# Patient Record
Sex: FEMALE | Race: WHITE | Employment: UNEMPLOYED | URBAN - METROPOLITAN AREA
[De-identification: names, ages, dates, MRNs, and addresses within clinical notes are randomized per-mention and may not be internally consistent; named-entity substitution may affect disease eponyms.]

---
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
## 2023-01-10 NOTE — PATIENT INSTRUCTIONS
"  Patient Education   After Your COVID-19 (Coronavirus) Test  You have been tested for COVID-19 (coronavirus).   If you'll have surgery in the next few days, we'll let you know ahead of time if you have the virus. Please call your surgeon's office with any questions.  For all other patients: Results are usually available in [employer] within 2 to 3 days.   If you do not have a [employer] account, you'll get a letter in the mail in about 7 to 10 days.   [employer] is often the fastest way to get test results. Please sign up if you do not already have a [employer] account. See the handout Getting COVID-19 Test Results in [employer] for help.  What if my test result is positive?  If your test is positive and you have not viewed your result in [employer], you'll get a phone call with your result. (A positive test means that you have the virus.)     Follow the tips under \"How do I self-isolate?\" below for 10 days (20 days if you have a weak immune system).    You don't need to be retested for COVID-19 before going back to school or work. As long as you're fever-free and feeling better, you can go back to school, work and other activities after waiting the 10 or 20 days.  What if I have questions after I get my results?  If you have questions about your results, please visit our testing website at [email or web address].   After 7 to 10 days, if you have not gotten your results:     Call [phone] ([phone]) and ask to speak with our COVID-19 results team.    If you're being treated at an infusion center: Call your infusion center directly.  What are the symptoms of COVID-19?  Cough, fever and trouble breathing are the most common signs of COVID-19.  Other symptoms can include new headaches, new muscle or body aches, new and unexplained fatigue (feeling very tired), chills, sore throat, congestion (stuffy or runny nose), diarrhea (loose poop), loss of taste or smell, belly pain, and nausea or vomiting " "(feeling sick to your stomach or throwing up).  You may already have symptoms of COVID-19, or they may show up later.  What should I do if I have symptoms?  If you're having surgery: Call your surgeon's office.  For all other patients: Stay home and away from others (self-isolate) until ...    You've had no fever--and no medicine that reduces fever--for 1 full day (24 hours), AND    Other symptoms have gotten better. For example, your cough or breathing has improved, AND    At least 10 days have passed since your symptoms first started.  How do I self-isolate?    Stay in your own room, even for meals. Use your own bathroom if you can.    Stay away from others in your home. No hugging, kissing or shaking hands. No visitors.    Don't go to work, school or anywhere else.    Clean \"high touch\" surfaces often (doorknobs, counters, handles). Use household cleaning spray or wipes. You'll find a full list of  on the EPA website: www.epa.gov/pesticide-registration/list-n-disinfectants-use-against-sars-cov-2.    Cover your mouth and nose with a mask or other face covering to avoid spreading germs.    Wash your hands and face often. Use soap and water.    Caregivers in these groups are at risk for severe illness due to COVID-19:  ? People 65 years and older  ? People who live in a nursing home or long-term care facility  ? People with chronic disease (lung, heart, cancer, diabetes, kidney, liver, immunologic)  ? People who have a weakened immune system, including those who:    Are in cancer treatment    Take medicine that weakens the immune system, such as corticosteroids    Had a bone marrow or organ transplant    Have an immune deficiency    Have poorly controlled HIV or AIDS    Are obese (body mass index of 40 or higher)    Smoke regularly    Caregivers should wear gloves while washing dishes, handling laundry and cleaning bedrooms and bathrooms.    Use caution when washing and drying laundry: Don't shake dirty " laundry and use the warmest water setting that you can.    For more tips on managing your health at home, go to www.cdc.gov/coronavirus/2019-ncov/downloads/10Things.pdf.  How can I take care of myself at home?  1. Get lots of rest. Drink extra fluids (unless a doctor has told you not to).  2. Take Tylenol (acetaminophen) for fever or pain. If you have liver or kidney problems, ask your family doctor if it's OK to take Tylenol.   Adults can take either:  ? 650 mg (two 325 mg pills) every 4 to 6 hours, or   ? 1,000 mg (two 500 mg pills) every 8 hours as needed.  ? Note: Don't take more than 3,000 mg in one day. Acetaminophen is found in many medicines (both prescribed and over-the-counter medicines). Read all labels to be sure you don't take too much.   For children, check the Tylenol bottle for the right dose. The dose is based on the child's age or weight.  3. If you have other health problems (like cancer, heart failure, an organ transplant or severe kidney disease): Call your specialty clinic if you don't feel better in the next 2 days.  4. Know when to call 911. Emergency warning signs include:  ? Trouble breathing or shortness of breath  ? Chest pain or pressure that doesn't go away  ? Feeling confused like you haven't felt before, or not being able to wake up  ? Bluish-colored lips or face  5. If your doctor prescribed a blood thinner medicine: Follow their instructions.  Where can I get more information?    [facility] - About COVID-19:   [email or web address]    CDC - If You're Sick: cdc.gov/coronavirus/2019-ncov/about/steps-when-sick.html    CDC - Ending Home Isolation: www.cdc.gov/coronavirus/2019-ncov/hcp/disposition-in-home-patients.html    CDC - Caring for Someone: www.cdc.gov/coronavirus/2019-ncov/if-you-are-sick/care-for-someone.html    [facility] - Interim Guidance for Hospital Discharge to Home: www.health.[facility].[place]/diseases/coronavirus/hcp/hospdischarge.pdf    [facility]  "clinical trials (COVID-19 research studies): [email or web address]-clinical-trials    Below are the COVID-19 hotlines at the [place] Department of Health ([facility]). Interpreters are available.  ? For health questions: Call 266-027-5394 or [phone] (7 a.m. to 7 p.m.)  ? For questions about schools and childcare: Call 503-398-7072 or [phone] (7 a.m. to 7 p.m.)    For informational purposes only. Not to replace the advice of your health care provider. Clinically reviewed by Infection Prevention and the [facility] COVID-19 Clinical Team. Copyright   2020 [employer]. All rights reserved. [employer] 408290 - Rev 11/11/20.       Patient Education     Viral Gastroenteritis  What is viral gastroenteritis?  Viral gastroenteritis is an inflammation, swelling, and irritation of the inside lining of your gastrointestinal tract. A virus causes this illness. It can infect your stomach, small intestine, and large intestine.  Viral gastroenteritis is very common. In most cases, it lasts only a few days and doesn t require treatment. The biggest danger is dehydration from loss of fluid due to diarrhea and vomiting.  What causes viral gastroenteritis?  Several viruses can cause gastroenteritis. Viruses can be found in the vomit and the diarrhea of infected people. It can live for a long time outside the body. People who are infected can spread the virus to objects they touch, especially if they don t wash their hands after using the bathroom. Food workers with the infection can spread it to others through food and beverages. Sewage that gets into the water supply can also spread the illness. Although viral gastroenteritis is sometimes called \"stomach flu,\" the seasonal influenza (flu) virus does not cause it.  Some of the common viruses that cause gastroenteritis include:    Rotavirus. This virus most commonly infects infants age 3 to 15 months. The illness lasts for 3 to 7 days and is most common " in fall and winter.    Norovirus. This is the most common cause of adult infections and the virus that s usually responsible for outbreaks on cruise ships. Symptoms last from 1 to 3 days and can occur any time of the year.    Adenovirus. This virus occurs year-round and affects children under age 2. Symptoms last from 5 to 12 days.  Many other viruses can also cause viral gastroenteritis.  What are the symptoms of viral gastroenteritis?  Symptoms of viral gastroenteritis usually begin about 1 to 2 days after the virus gets into the body.  Common symptoms include:    Nausea    Vomiting    Watery diarrhea   Other possible symptoms are:    Headache    Fever    Chills    Stomachache  Signs of dehydration:    Decreased urine output    Dark-colored urine    Dry skin    Thirst    Dizziness  Signs of dehydration in young children:    Dry diapers (from a lack of urination)    Lack of tears    Dry mouth    Drowsiness    Sunken fontanel (the soft spot on the top of an infant s head)  How is viral gastroenteritis diagnosed?  Your healthcare provider will most likely diagnose your condition based on your history and symptoms. You will rarely need testing. If your symptoms persist, your healthcare provider may ask for a stool sample to look for viruses, bacteria, and parasites.  Can viral gastroenteritis be prevented?  Vaccines are available to protect children from rotavirus. Healthcare providers give shots to babies before age 6 months. You and your children can help prevent viral gastroenteritis by taking these steps:    Wash hands for 20 seconds with soap and water after going to the bathroom, after changing a diaper, and before touching any food.    Use alcohol-based sanitizers.    If someone in the house has gastroenteritis, wash all surfaces that might be contaminated with a bleach-based .    Don't eat or drink any food or water with warnings of contamination.  How is viral gastroenteritis treated?  Specific  treatment is usually not needed. In most cases, you simply need to drink plenty of fluids and rest at home until the virus leaves your system. In rare cases, you may need treatment for severe dehydration, with IV (intravenous) fluids.  Helpful home care tips include:    Drink plenty of light fluids like water, ice chips, fruit juice, and broth. Keep in mind that sports drinks are high in sugar and are not appropriate if you are extremely dehydrated. In this case, you will need an oral rehydration solution.    Don't have drinks that contain milk, caffeine, or alcohol.    Once you feel hungry again, start with mild, easy to digest foods.    Rehydrate children with oral rehydration solutions.    You may take antidiarrheal medicines for a couple days. But don't take these if you have a fever or bloody stool. Don't take them if you are an elderly adult.. Don't give these to a child.  When should I call my healthcare provider?  Viral gastroenteritis is common in children and adults. In most cases, the disease is not serious and will run its course in a few days. Call your healthcare provider if you or a family member has vomiting or diarrhea that s not getting better, if you see blood or tar-like stool, or if you have any signs of dehydration.  Key points about viral gastroenteritis    Viral gastroenteritis is an inflammation of the inside lining of your gastrointestinal tract.    It can be caused by rotavirus, norovirus, adenovirus, and other viruses.    Babies can be vaccinated against rotavirus.    Symptoms of viral gastroenteritis are nausea, vomiting, and watery diarrhea.    Dehydration is the most serious complication of this illness.    This illness should run its course in a few days but may need medical attention of diarrhea or vomiting persists or if there are signs of dehydration.    Next steps  Tips to help you get the most from a visit to your healthcare provider:    Know the reason for your visit and what  you want to happen.    Before your visit, write down questions you want answered.    Bring someone with you to help you ask questions and remember what your provider tells you.    At the visit, write down the name of a new diagnosis, and any new medicines, treatments, or tests. Also write down any new instructions your provider gives you.    Know why a new medicine or treatment is prescribed, and how it will help you. Also know what the side effects are.    Ask if your condition can be treated in other ways.    Know why a test or procedure is recommended and what the results could mean.    Know what to expect if you do not take the medicine or have the test or procedure.    If you have a follow-up appointment, write down the date, time, and purpose for that visit.    Know how you can contact your provider if you have questions.  [employer] last reviewed this educational content on 2/1/2019 2000-2021 [employer]. All rights reserved. This information is not intended as a substitute for professional medical care. Always follow your healthcare professional's instructions.            colonoscopy